# Patient Record
Sex: MALE | Race: WHITE | Employment: OTHER | URBAN - METROPOLITAN AREA
[De-identification: names, ages, dates, MRNs, and addresses within clinical notes are randomized per-mention and may not be internally consistent; named-entity substitution may affect disease eponyms.]

---

## 2018-05-01 ENCOUNTER — LAB REQUISITION (OUTPATIENT)
Dept: LAB | Facility: HOSPITAL | Age: 58
End: 2018-05-01
Attending: COLON & RECTAL SURGERY
Payer: COMMERCIAL

## 2018-05-01 DIAGNOSIS — Z86.010 HISTORY OF COLONIC POLYPS: ICD-10-CM

## 2018-05-01 PROCEDURE — 88305 TISSUE EXAM BY PATHOLOGIST: CPT | Performed by: COLON & RECTAL SURGERY

## 2018-05-06 NOTE — PROGRESS NOTES
The patient had a benign polyp. It is the type that normally needs a follow up colonoscopy in 3 years.   Check the chart and colonoscopy results sheet to see if they have been requested to come back in less than 3 years, some will be called sooner for larg

## 2018-05-07 NOTE — PROGRESS NOTES
Talked with pt about the results. Pt states good understanding. Pt placed in recall for every 3 years.

## 2021-04-05 ENCOUNTER — TELEPHONE (OUTPATIENT)
Dept: SURGERY | Facility: CLINIC | Age: 61
End: 2021-04-05

## 2021-04-26 ENCOUNTER — OFFICE VISIT (OUTPATIENT)
Dept: SURGERY | Facility: CLINIC | Age: 61
End: 2021-04-26
Payer: COMMERCIAL

## 2021-04-26 VITALS
DIASTOLIC BLOOD PRESSURE: 95 MMHG | TEMPERATURE: 97 F | SYSTOLIC BLOOD PRESSURE: 151 MMHG | HEIGHT: 71 IN | HEART RATE: 94 BPM | WEIGHT: 310 LBS | BODY MASS INDEX: 43.4 KG/M2

## 2021-04-26 DIAGNOSIS — Z80.0 FAMILY HISTORY OF COLON CANCER: ICD-10-CM

## 2021-04-26 DIAGNOSIS — Z01.818 PRE-OP TESTING: ICD-10-CM

## 2021-04-26 DIAGNOSIS — D12.6 ADENOMATOUS POLYP OF COLON, UNSPECIFIED PART OF COLON: Primary | ICD-10-CM

## 2021-04-26 PROCEDURE — 3077F SYST BP >= 140 MM HG: CPT | Performed by: COLON & RECTAL SURGERY

## 2021-04-26 PROCEDURE — 3080F DIAST BP >= 90 MM HG: CPT | Performed by: COLON & RECTAL SURGERY

## 2021-04-26 PROCEDURE — 99203 OFFICE O/P NEW LOW 30 MIN: CPT | Performed by: COLON & RECTAL SURGERY

## 2021-04-26 PROCEDURE — 3008F BODY MASS INDEX DOCD: CPT | Performed by: COLON & RECTAL SURGERY

## 2021-04-26 RX ORDER — SODIUM, POTASSIUM,MAG SULFATES 17.5-3.13G
SOLUTION, RECONSTITUTED, ORAL ORAL
Qty: 1 KIT | Refills: 0 | Status: SHIPPED | OUTPATIENT
Start: 2021-04-26

## 2021-04-26 RX ORDER — ATORVASTATIN CALCIUM 10 MG/1
TABLET, FILM COATED ORAL
COMMUNITY
Start: 2021-03-16

## 2021-04-26 RX ORDER — ASPIRIN 325 MG
325 TABLET ORAL DAILY
COMMUNITY
Start: 2018-10-08

## 2021-04-26 RX ORDER — SODIUM CHLORIDE 5 %
1 OINTMENT (GRAM) OPHTHALMIC (EYE) NIGHTLY
COMMUNITY

## 2021-04-26 RX ORDER — LOSARTAN POTASSIUM AND HYDROCHLOROTHIAZIDE 25; 100 MG/1; MG/1
TABLET ORAL
COMMUNITY
Start: 2021-03-16

## 2021-04-26 RX ORDER — AZELASTINE 1 MG/ML
SPRAY, METERED NASAL
COMMUNITY
Start: 2020-12-14

## 2021-04-26 RX ORDER — ALLOPURINOL 300 MG/1
300 TABLET ORAL DAILY
COMMUNITY
Start: 2020-12-15

## 2021-04-26 RX ORDER — SODIUM, POTASSIUM,MAG SULFATES 17.5-3.13G
SOLUTION, RECONSTITUTED, ORAL ORAL
Qty: 1 KIT | Refills: 0 | Status: SHIPPED | OUTPATIENT
Start: 2021-04-26 | End: 2021-04-26

## 2021-04-26 NOTE — PATIENT INSTRUCTIONS
The patient presents today in consultation for a personal history of colon polyps     The patient has had prior colonoscopies by myself. The most recent colonoscopy was in May 2018.   He has had colon polyps in the past.     The patient denies having any b

## 2021-04-26 NOTE — PROGRESS NOTES
Follow Up Visit Note       Active Problems      1. Adenomatous polyp of colon, unspecified part of colon    2. Family history of colon cancer, in uncle at 79    3.  Pre-op testing          Chief Complaint   Patient presents with:  Personal history of colon note as detailed above    Maribell Prather MD FACS FASCRS    My total time spent with this patient on today's visit was 30 minutes.   This includes time in preparation, obtaining and reviewing history, performing the examination, counseling and education, o directed. Instructions mailed to pt. Colonoscopy scheduled for 4/15/14. (Patient not taking: Reported on 4/26/2021 ), Disp: 1 Bottle, Rfl: 0     Review of Systems  The Review of Systems has been reviewed by me during today.   Review of Systems   Constitutio respiratory distress. Breath sounds: Normal breath sounds. No wheezing or rales. Abdominal:      General: Bowel sounds are normal. There is no distension. Palpations: Abdomen is soft. Abdomen is not rigid.  There is no shifting dullness, fluid w nausea, vomiting, fevers, chills, or abdominal distention. They are not having any unintentional weight loss. The patient does have family history of colon cancer in an uncle at the age of 79.   He has no other first-degree family history of colon canc

## 2021-05-08 ENCOUNTER — LAB ENCOUNTER (OUTPATIENT)
Dept: LAB | Facility: HOSPITAL | Age: 61
End: 2021-05-08
Attending: COLON & RECTAL SURGERY
Payer: COMMERCIAL

## 2021-05-08 DIAGNOSIS — Z01.818 PRE-OP TESTING: ICD-10-CM

## 2021-05-11 ENCOUNTER — LAB REQUISITION (OUTPATIENT)
Dept: LAB | Facility: HOSPITAL | Age: 61
End: 2021-05-11
Payer: COMMERCIAL

## 2021-05-11 DIAGNOSIS — Z86.010 PERSONAL HISTORY OF COLONIC POLYPS: ICD-10-CM

## 2021-05-11 PROCEDURE — 88305 TISSUE EXAM BY PATHOLOGIST: CPT | Performed by: COLON & RECTAL SURGERY

## 2021-05-13 ENCOUNTER — TELEPHONE (OUTPATIENT)
Dept: SURGERY | Facility: CLINIC | Age: 61
End: 2021-05-13

## 2023-06-19 ENCOUNTER — OFFICE VISIT (OUTPATIENT)
Facility: LOCATION | Age: 63
End: 2023-06-19
Payer: COMMERCIAL

## 2023-06-19 VITALS — TEMPERATURE: 99 F

## 2023-06-19 DIAGNOSIS — Z80.0 FAMILY HISTORY OF COLON CANCER: ICD-10-CM

## 2023-06-19 DIAGNOSIS — K57.90 DIVERTICULOSIS: ICD-10-CM

## 2023-06-19 DIAGNOSIS — D12.6 ADENOMATOUS POLYP OF COLON, UNSPECIFIED PART OF COLON: Primary | ICD-10-CM

## 2023-06-19 PROCEDURE — 99213 OFFICE O/P EST LOW 20 MIN: CPT | Performed by: COLON & RECTAL SURGERY

## 2023-06-19 RX ORDER — POLYETHYLENE GLYCOL 3350, SODIUM CHLORIDE, SODIUM BICARBONATE, POTASSIUM CHLORIDE 420; 11.2; 5.72; 1.48 G/4L; G/4L; G/4L; G/4L
POWDER, FOR SOLUTION ORAL
Qty: 1 EACH | Refills: 0 | Status: SHIPPED | OUTPATIENT
Start: 2023-06-19

## 2023-06-21 PROBLEM — K57.90 DIVERTICULOSIS: Status: ACTIVE | Noted: 2023-06-21

## 2023-07-24 RX ORDER — NORTRIPTYLINE HYDROCHLORIDE 10 MG/1
10 CAPSULE ORAL NIGHTLY
COMMUNITY

## 2023-07-24 RX ORDER — GABAPENTIN 300 MG/1
600 CAPSULE ORAL 3 TIMES DAILY
COMMUNITY

## 2023-07-24 RX ORDER — CHLORAL HYDRATE 500 MG
1000 CAPSULE ORAL DAILY
COMMUNITY

## 2023-07-24 RX ORDER — LATANOPROST 50 UG/ML
SOLUTION/ DROPS OPHTHALMIC NIGHTLY
COMMUNITY

## 2023-07-24 RX ORDER — MULTIVIT-MIN/IRON FUM/FOLIC AC 7.5 MG-4
1 TABLET ORAL DAILY
COMMUNITY

## 2023-08-03 ENCOUNTER — TELEPHONE (OUTPATIENT)
Facility: LOCATION | Age: 63
End: 2023-08-03

## 2023-08-03 DIAGNOSIS — D12.0 BENIGN NEOPLASM OF CECUM: Primary | ICD-10-CM

## 2023-08-03 DIAGNOSIS — Z80.0 FAMILY HISTORY OF MALIGNANT NEOPLASM OF GASTROINTESTINAL TRACT: ICD-10-CM

## 2023-08-03 NOTE — TELEPHONE ENCOUNTER
Transaction ID: [id number] ID: [id number] Date: 2023-08-03  Kyle Pratt Patient  Member ID  [id number]    Date of Birth  1960-04-19    Gender  Male    Transaction Type  Outpatient Authorization    Organization  [address] [place] [address]    Payer  daniel Parekh [facility] logo     Certificate Information  Reference Number  [id number]    Status  NO ACTION REQUIRED    Message  Requested Service does not require preauthorization. We would strongly encourage you to check benefits for this service.     Member Information  Patient Name  Kyle Pratt    Patient Date of Birth  [birth year]    Patient Gender  Male    Member ID  [id number]    Relationship to [address] Name  Kyle Pratt    Requesting Provider     Name  Sandra Brumfield 10  [id number]    Tax Id  [id number]    Specialty  [id number]  Provider Role  Provider    Address  [address] [place] [address] [address]    Phone  [phone]  Fax  [phone]    Contact Name  [address] [place] [address]  Service Type  2 - Surgical    Place of Service  25 - On [facility]    Service From - To Date  2023-08-08 - 2023-11-08    Level of Service  Elective    Diagnosis Code 1  U947 - Polyp of colon    Diagnosis Code 2  I040 - Family history of malignant neoplasm of digestive organs    Diagnosis Code 3   - German Valle[place] of lg int w/o perforation or abscess w/o bleeding    Procedure Code 1 (CPT/HCPCS)  68161 - DIAGNOSTIC COLONOSCOPY    Quantity  1 Units    Status  NO ACTION REQUIRED    Rendering Provider/Facility     Provider 1  Name  Sandra Brumfield 10  [id number]    Specialty  [id number]  Provider Role  Attending    Address  [address] [place] [address], [facility] [address]    Provider 2  Name  65Radha Bhakti    NPI  [id number]    Provider Role  Facility    Address  [address] [place] [address], [facility] [address]      [phone]

## 2023-08-08 ENCOUNTER — ANESTHESIA (OUTPATIENT)
Dept: ENDOSCOPY | Facility: HOSPITAL | Age: 63
End: 2023-08-08
Payer: COMMERCIAL

## 2023-08-08 ENCOUNTER — ANESTHESIA EVENT (OUTPATIENT)
Dept: ENDOSCOPY | Facility: HOSPITAL | Age: 63
End: 2023-08-08
Payer: COMMERCIAL

## 2023-08-08 ENCOUNTER — HOSPITAL ENCOUNTER (OUTPATIENT)
Facility: HOSPITAL | Age: 63
Setting detail: HOSPITAL OUTPATIENT SURGERY
Discharge: HOME | End: 2023-08-08
Attending: SURGERY | Admitting: SURGERY
Payer: COMMERCIAL

## 2023-08-08 VITALS
HEART RATE: 92 BPM | BODY MASS INDEX: 42.7 KG/M2 | TEMPERATURE: 98 F | DIASTOLIC BLOOD PRESSURE: 76 MMHG | SYSTOLIC BLOOD PRESSURE: 136 MMHG | HEIGHT: 71 IN | RESPIRATION RATE: 16 BRPM | WEIGHT: 305 LBS | OXYGEN SATURATION: 95 %

## 2023-08-08 DIAGNOSIS — Z80.0 FAMILY HISTORY OF COLON CANCER: ICD-10-CM

## 2023-08-08 DIAGNOSIS — D12.6 ADENOMATOUS POLYP OF COLON, UNSPECIFIED PART OF COLON: ICD-10-CM

## 2023-08-08 DIAGNOSIS — K57.90 DIVERTICULOSIS: ICD-10-CM

## 2023-08-08 PROCEDURE — 88305 TISSUE EXAM BY PATHOLOGIST: CPT | Performed by: SURGERY

## 2023-08-08 PROCEDURE — 0DBM8ZX EXCISION OF DESCENDING COLON, VIA NATURAL OR ARTIFICIAL OPENING ENDOSCOPIC, DIAGNOSTIC: ICD-10-PCS | Performed by: SURGERY

## 2023-08-08 PROCEDURE — 0DBK8ZX EXCISION OF ASCENDING COLON, VIA NATURAL OR ARTIFICIAL OPENING ENDOSCOPIC, DIAGNOSTIC: ICD-10-PCS | Performed by: SURGERY

## 2023-08-08 RX ORDER — LIDOCAINE HYDROCHLORIDE 10 MG/ML
INJECTION, SOLUTION EPIDURAL; INFILTRATION; INTRACAUDAL; PERINEURAL AS NEEDED
Status: DISCONTINUED | OUTPATIENT
Start: 2023-08-08 | End: 2023-08-08 | Stop reason: SURG

## 2023-08-08 RX ORDER — LABETALOL HYDROCHLORIDE 5 MG/ML
5 INJECTION, SOLUTION INTRAVENOUS EVERY 5 MIN PRN
Status: DISCONTINUED | OUTPATIENT
Start: 2023-08-08 | End: 2023-08-08

## 2023-08-08 RX ORDER — METOCLOPRAMIDE HYDROCHLORIDE 5 MG/ML
10 INJECTION INTRAMUSCULAR; INTRAVENOUS AS NEEDED
Status: DISCONTINUED | OUTPATIENT
Start: 2023-08-08 | End: 2023-08-08

## 2023-08-08 RX ORDER — NALOXONE HYDROCHLORIDE 0.4 MG/ML
80 INJECTION, SOLUTION INTRAMUSCULAR; INTRAVENOUS; SUBCUTANEOUS AS NEEDED
Status: DISCONTINUED | OUTPATIENT
Start: 2023-08-08 | End: 2023-08-08

## 2023-08-08 RX ORDER — SODIUM CHLORIDE, SODIUM LACTATE, POTASSIUM CHLORIDE, CALCIUM CHLORIDE 600; 310; 30; 20 MG/100ML; MG/100ML; MG/100ML; MG/100ML
INJECTION, SOLUTION INTRAVENOUS CONTINUOUS
Status: DISCONTINUED | OUTPATIENT
Start: 2023-08-08 | End: 2023-08-08

## 2023-08-08 RX ORDER — ONDANSETRON 2 MG/ML
4 INJECTION INTRAMUSCULAR; INTRAVENOUS AS NEEDED
Status: DISCONTINUED | OUTPATIENT
Start: 2023-08-08 | End: 2023-08-08

## 2023-08-08 RX ADMIN — SODIUM CHLORIDE, SODIUM LACTATE, POTASSIUM CHLORIDE, CALCIUM CHLORIDE: 600; 310; 30; 20 INJECTION, SOLUTION INTRAVENOUS at 08:47:00

## 2023-08-08 RX ADMIN — LIDOCAINE HYDROCHLORIDE 50 MG: 10 INJECTION, SOLUTION EPIDURAL; INFILTRATION; INTRACAUDAL; PERINEURAL at 08:21:00

## 2023-08-08 RX ADMIN — SODIUM CHLORIDE, SODIUM LACTATE, POTASSIUM CHLORIDE, CALCIUM CHLORIDE: 600; 310; 30; 20 INJECTION, SOLUTION INTRAVENOUS at 08:15:00

## 2023-08-08 NOTE — ANESTHESIA POSTPROCEDURE EVALUATION
[facility] Patient Status:  Hospital Outpatient Surgery   Age/Gender 61year old male MRN [id number]   Location [address] [address] Attending Edgar Goyal DO   Hosp Day # 0 PCP No primary care provider on file. Anesthesia Post-op Note    COLONOSCOPY with hot snare polypectomy    Procedure Summary       Date: 08/08/23 Room / Location: [facility] ENDOSCOPY 03 / [facility] ENDOSCOPY    Anesthesia Start: Roshan Evon Anesthesia Stop: 0560    Procedure: COLONOSCOPY with hot snare polypectomy Diagnosis:       Adenomatous polyp of colon, unspecified part of colon      Family history of colon cancer      Diverticulosis      (polyps, rare diverticulosis)    Surgeons: Edgar Goyal DO Anesthesiologist: Steven Turpin MD    Anesthesia Type: MAC ASA Status: 3            Anesthesia Type: MAC    Vitals Value Taken Time   BP 76/29 08/08/23 0853   Temp  08/08/23 0856   Pulse 92 08/08/23 0854   Resp 16 08/08/23 0856   SpO2 95 % 08/08/23 0854   Vitals shown include unvalidated device data. Patient Location: Endoscopy    Anesthesia Type: MAC    Airway Patency: patent    Postop Pain Control: adequate    Mental Status: preanesthetic baseline    Nausea/Vomiting: none    Cardiopulmonary/Hydration status: hypovolemic but CV stable    Complications: no apparent anesthesia related complications    Postop vital signs: stable    Comments: Fluid bolus given    Dental Exam: Unchanged from Preop    Patient to be discharged home when criteria met.

## 2023-08-08 NOTE — ANESTHESIA PROCEDURE NOTES
Peripheral IV  Date/Time: 8/8/2023 8:29 AM  Inserted by: Amish Roche MD    Placement  Needle size: 20 G  Laterality: left  Location: hand  Site prep: alcohol  Technique: anatomical landmarks  Attempts: 1

## 2023-08-08 NOTE — BRIEF OP NOTE
Pre-Operative Diagnosis: Adenomatous polyp of colon, unspecified part of colon [D12.6]  Family history of colon cancer [Z80.0]  Diverticulosis [K57.90]     Post-Operative Diagnosis: polyps, rare diverticulosis      Procedure Performed:   COLONOSCOPY with hot snare polypectomy    Surgeon(s) and Role:     Elvira Casper DO - Primary    Assistant(s):        Surgical Findings:      Specimen:      Estimated Blood Loss: No data recorded    Dictation Number:      Ary Prince DO  8/8/2023  8:51 AM

## 2023-08-08 NOTE — OPERATIVE REPORT
[facility]    PATIENT'S NAME: Ezequiel Bell   ATTENDING PHYSICIAN: Jenni Rosas D.O.   OPERATING PHYSICIAN: Jenni Rosas D.O.   PATIENT ACCOUNT#:   [de-identified]    LOCATION:  [facility] ROOMS [facility]  MEDICAL RECORD #:   [id number]       YOB: 1960  ADMISSION DATE:       08/08/2023      OPERATION DATE:  08/08/2023    OPERATIVE REPORT      PREOPERATIVE DIAGNOSIS:  History of colon polyps. POSTOPERATIVE DIAGNOSIS:  Colon polyps of the ascending, proximal descending, and distal descending colon and rare scattered sigmoid diverticulosis. PROCEDURE:  Pancolonoscopy to cecum with hot snare polypectomy at above locations. ANESTHESIA:  MAC. PREPARATION:  Lewisberry scale 3 ascending, 3 transverse, 3 descending. Repeat colonoscopy 2 years. OPERATIVE TECHNIQUE:  An informed consent was previously obtained. The patient was taken to the endoscopy suite and placed in the left lateral decubitus position. Digital rectal examination was carried out. Olympus colonoscope advanced into the rectal ampulla. Scope was traversed through rectum, sigmoid, descending, transverse, and ascending colons under direct visualization of the lumen. The base cecum was visualized. Ileocecal valve was evaluated and terminal ileum intubated. The scope was slowly withdrawn into the ascending colon where the patient was found to have a 7 mm sessile polyp, hot snare polypectomied and retrieved. The scope was then slowly withdrawn through transverse colon. No evidence of polypoid disease within the descending colon. The patient had evidence of two 6 mm polyps that were hot snare polypectomied and retrieved. These were in close proximity to each other within 2 cm. The scope was then slowly withdrawn into the distal descending colon where a 1 cm pedunculated polyp was identified, hot snare polypectomied and retrieved. No evidence of bleeding at the polypectomy site.   The scope was slowly withdrawn through the remainder of the sigmoid colon demonstrating rare scattered sigmoid diverticulosis and withdrawn to the rectum where the scope was retrieved. The patient had a complaint of inner rectal protrusion of tissue a couple of weeks ago that subsequently resolved and was bleeding. On external examination, the patient had no evidence of external hemorrhoidal disease on digital rectal examination, and on endoscopy, the patient had evidence of modest internal hemorrhoidal disease. The patient was advised if he should have any further problems at all as far as bleeding or extraneous tissue from the anorectal area to return immediately the my office.     Dictated By Alonso Gill D.O.  d: 08/08/2023 09:37:41  t: 08/08/2023 13:06:31  [facility] [id number]  RM/    cc: Abdulaziz Lin D.O.

## 2023-08-14 ENCOUNTER — PATIENT OUTREACH (OUTPATIENT)
Facility: LOCATION | Age: 63
End: 2023-08-14

## 2025-06-12 ENCOUNTER — OFFICE VISIT (OUTPATIENT)
Facility: LOCATION | Age: 65
End: 2025-06-12
Payer: MEDICARE

## 2025-06-12 VITALS
SYSTOLIC BLOOD PRESSURE: 134 MMHG | TEMPERATURE: 98 F | DIASTOLIC BLOOD PRESSURE: 86 MMHG | RESPIRATION RATE: 18 BRPM | OXYGEN SATURATION: 98 % | HEART RATE: 83 BPM

## 2025-06-12 DIAGNOSIS — Z86.0100 HISTORY OF COLON POLYPS: Primary | ICD-10-CM

## 2025-06-12 PROCEDURE — 99213 OFFICE O/P EST LOW 20 MIN: CPT | Performed by: STUDENT IN AN ORGANIZED HEALTH CARE EDUCATION/TRAINING PROGRAM

## 2025-06-12 RX ORDER — POLYETHYLENE GLYCOL 3350, SODIUM CHLORIDE, SODIUM BICARBONATE, POTASSIUM CHLORIDE 420; 11.2; 5.72; 1.48 G/4L; G/4L; G/4L; G/4L
POWDER, FOR SOLUTION ORAL
Qty: 1 EACH | Refills: 0 | Status: SHIPPED | OUTPATIENT
Start: 2025-06-12

## 2025-06-12 NOTE — H&P
The following individual(s) verbally consented to be recorded using ambient AI listening technology and understand that they can each withdraw their consent to this listening technology at any point by asking the clinician to turn off or pause the recording:    Patient name: Sanjay Gutierrez Josse  Additional names:

## 2025-06-14 NOTE — H&P
New Patient Visit Note    The following individual(s) verbally consented to be recorded using ambient AI listening technology and understand that they can each withdraw their consent to this listening technology at any point by asking the clinician to turn off or pause the recording:    Patient name: Sanjay Loaiza        Active Problems      1. History of colon polyps        Chief Complaint   Chief Complaint   Patient presents with    New Patient     NP- 2 year cscope recall, last cscope in 2023 w/- hx of polyps, denies symptoms, family hx of colon cancer        History of Present Illness   History of Present Illness  Mr. Sanjay Loaiza is a 65 year old male with a history of colon polyps who presents to establish care with me and potentially schedule a surveillance colonoscopy.    He has a long-standing history of colon polyps, which have been monitored with regular colonoscopies about every two years.  Patient was previously following with Dr. Lu for his surveillance colonoscopies.    Patient's most recent colonoscopy was performed by Dr. Vides on 8/8/2023.  Dr. Vides discovered a 7 mm sessile polyp in the ascending colon (tubular adenoma) two 6 mm polyps in the descending colon (tubular adenoma), and a 1 cm pedunculated polyp in the distal descending colon (tubulovillous adenoma).  Dr. Vides recommended a 2-year recall.    Patient denies any rectal bleeding, change in bowel habits, anemia or unintentional weight loss.  His maternal uncle had colon cancer diagnosed at an unknown older age.  No other family history of colorectal cancer.  Patient takes aspirin, no other blood thinners.         Allergies  Sanjay has no known allergies.    Past Medical / Surgical / Social / Family History    The past medical and past surgical history have been reviewed by me today.    Past Medical History[1]  Past Surgical History[2]    The family history and social history have been reviewed by me today.    Family  History[3]  Social Hx on file[4]   Medications - Current[5]      Review of Systems  A 10 point review of systems was performed and negative unless otherwise documented per HPI.    Physical Findings   /86   Pulse 83   Temp 97.9 °F (36.6 °C) (Temporal)   Resp 18   SpO2 98%   Physical Exam  Vitals and nursing note reviewed. Exam conducted with a chaperone present.   Constitutional:       General: He is not in acute distress.  HENT:      Head: Normocephalic and atraumatic.      Mouth/Throat:      Mouth: Mucous membranes are moist.   Cardiovascular:      Rate and Rhythm: Normal rate and regular rhythm.   Pulmonary:      Effort: Pulmonary effort is normal.   Abdominal:      General: There is no distension.      Palpations: Abdomen is soft.      Tenderness: There is no abdominal tenderness.   Musculoskeletal:         General: No deformity.   Skin:     General: Skin is warm and dry.   Neurological:      General: No focal deficit present.      Mental Status: He is alert.   Psychiatric:         Mood and Affect: Mood normal.             Assessment and Plan   Assessment & Plan  Colon polyps  Long-standing colon polyps with biennial surveillance colonoscopies. Previous colonoscopy identified multiple polyps: three low-risk precancerous (tubular adenoma) and one medium-risk precancerous (tubulovillous adenoma).  Patient currently asymptomatic.  - I recommend planning for a surveillance colonoscopy in the near future.  -The details of the colonoscopy procedure were discussed including the prep instructions, risks, benefits and alternatives.  - Patient expressed understanding and was agreeable to schedule a colonoscopy with me.  - The colonoscopy procedure has been scheduled for 10/28/2025 at [facility] under monitored anesthesia care.       Imaging & Referrals   None    Follow Up  No follow-ups on file.    Luis Kraus MD       [1]   Past Medical History:   High blood pressure    Sleep apnea   [2]   Past Surgical  History:  Procedure Laterality Date    Colonoscopy N/A 8/8/2023    Procedure: COLONOSCOPY with hot snare polypectomy;  Surgeon: Holden Vides DO;  Location:  ENDOSCOPY    Other surgical history  05/2020    Tore LT Quadracept     Spine surgery procedure unlisted  2014   [3] History reviewed. No pertinent family history.  [4]   Social History  Socioeconomic History    Marital status:    Tobacco Use    Smoking status: Never    Smokeless tobacco: Never   Vaping Use    Vaping status: Never Used   Substance and Sexual Activity    Alcohol use: Never    Drug use: Never   [5]   Current Outpatient Medications:     PEG 3350-KCl-Na Bicarb-NaCl 420 g Oral Recon Soln, Starting at 4:00 pm the night before procedure, drink 8 ounces of the prep every 15-20 minutes until finished, Disp: 1 each, Rfl: 0    gabapentin 300 MG Oral Cap, Take 2 capsules (600 mg total) by mouth 3 (three) times daily., Disp: , Rfl:     latanoprost 0.005 % Ophthalmic Solution, nightly., Disp: , Rfl:     omega-3 fatty acids 1000 MG Oral Cap, Take 1,000 mg by mouth daily., Disp: , Rfl:     nortriptyline 10 MG Oral Cap, Take 1 capsule (10 mg total) by mouth nightly., Disp: , Rfl:     Multiple Vitamins-Minerals (MULTI-VITAMIN/MINERALS) Oral Tab, Take 1 tablet by mouth daily., Disp: , Rfl:     allopurinol 300 MG Oral Tab, Take 1 tablet (300 mg total) by mouth daily., Disp: , Rfl:     aspirin 325 MG Oral Tab, Take 1 tablet (325 mg total) by mouth daily., Disp: , Rfl:     atorvastatin 10 MG Oral Tab, , Disp: , Rfl:     Azelastine HCl 0.1 % Nasal Solution, USE 2 SPRAYS NASALLY TWO  TIMES DAILY, Disp: , Rfl:     Losartan Potassium-HCTZ 100-25 MG Oral Tab, Take 1 tablet by mouth daily., Disp: , Rfl:     Sodium Chloride, Hypertonic, 5 % Ophthalmic Ointment, 1 Application nightly., Disp: , Rfl:

## 2025-06-16 ENCOUNTER — TELEPHONE (OUTPATIENT)
Facility: LOCATION | Age: 65
End: 2025-06-16

## 2025-06-16 NOTE — TELEPHONE ENCOUNTER
Nurse Rossi from [facility] PCP office recently contacted our office to inform Dr. Kraus and the nursing staff that they have received the faxed request for the patient’s medical clearance. She also stated that their office has reached out to the patient to schedule an appointment in order to complete the medical clearance process.    Call back # [phone]

## 2025-06-24 ENCOUNTER — TELEPHONE (OUTPATIENT)
Facility: LOCATION | Age: 65
End: 2025-06-24

## 2025-06-24 NOTE — TELEPHONE ENCOUNTER
Left voicemail for patient to find out who manages his Aspirin. Will await reply to confirm clearance to stop for colonoscopy.

## 2025-06-26 ENCOUNTER — TELEPHONE (OUTPATIENT)
Facility: LOCATION | Age: 65
End: 2025-06-26

## 2025-06-26 NOTE — TELEPHONE ENCOUNTER
Spoke with Sanjay (Patient) and this nurse advised patient that all aspirin should be stopped 9 days before surgery/colonoscopy.  Patient verbalized understanding.   Patient states he will have his primary care physician to send clearance by fax.

## 2025-06-27 ENCOUNTER — TELEPHONE (OUTPATIENT)
Facility: LOCATION | Age: 65
End: 2025-06-27

## 2025-06-27 NOTE — TELEPHONE ENCOUNTER
Anticoagulation Management received from Ac Burris MD.  Per note \"He can hold aspirin 9 days to his colonoscopy on 10/28/25\"  Faxed to Pre Admission Testing, patient notified and placed in binder.

## 2025-06-27 NOTE — TELEPHONE ENCOUNTER
Spoke with patient to let him know that we have received clearance for him to hold his daily 325 mg Aspirin 9 days prior to his colonoscopy on 10/28/25 from Ac Burris MD. A copy of clearance mailed to patient per his request. All questions answered.

## 2025-06-27 NOTE — TELEPHONE ENCOUNTER
Pt called asking if we received clearance letter from Dr Martin?  (I do not see it in pt's chart)  Pt req call back from nurse with update.

## (undated) DEVICE — 3M™ RED DOT™ MONITORING ELECTRODE WITH FOAM TAPE AND STICKY GEL, 50/BAG, 20/CASE, 72/PLT 2570: Brand: RED DOT™

## (undated) DEVICE — 1200CC GUARDIAN II: Brand: GUARDIAN

## (undated) DEVICE — KIT ENDO ORCAPOD 160/180/190

## (undated) DEVICE — BIOGUARD CLEANING ADAPTER

## (undated) DEVICE — REM POLYHESIVE ADULT PATIENT RETURN ELECTRODE: Brand: VALLEYLAB

## (undated) DEVICE — 10FT COMBINED O2 DELIVERY/CO2 MONITORING. FILTER WITH MICROSTREAM TYPE LUER: Brand: DUAL ADULT NASAL CANNULA

## (undated) DEVICE — TRAP SPEC REMOVAL ETRAP 15CM

## (undated) DEVICE — ENDOSCOPY PACK - LOWER: Brand: MEDLINE INDUSTRIES, INC.

## (undated) DEVICE — SNARE CAPTIFLEX MICRO-OVL OLY

## (undated) NOTE — LETTER
Luis Kraus M.D.    Surgical Clearance Needed    Date: 6/12/2025                                                                       From: [facility]    Attn: DANIELA MILLER PA-C                                               Fax: [phone]    RE: Surgical Clearance               # of Pages: 1        Patient Name: Sanjay Loaiza    Patient YOB: 1960    Consult For: ANTICOAGULATION CLEARANCE - OFF ASPIRIN FOR 7 DAYS.    Surgery: COLONOSCOPY    Date of Surgery: 11/25/25 AT [facility]        This facsimile transmission is provided for your internal use only. If the reader of this message is not the intended recipient, you are hereby notified that you have received this document in error, and that any review, dissemination, distribution, or copying of this message is strictly prohibited. If you have received this communication in error, please notify us immediately by telephone and return the original message to us by mail.

## (undated) NOTE — LETTER
21    Patient: Dennise Barton  : 1960 Visit date: 2021    Dear  Jaleesa Dimas    Thank you for referring Dennise Barton to my practice. Please find my assessment and plan below.     Assessment   Adenomatous polyp of colon, unspecified part of